# Patient Record
Sex: MALE | Race: WHITE | NOT HISPANIC OR LATINO | Employment: FULL TIME | URBAN - METROPOLITAN AREA
[De-identification: names, ages, dates, MRNs, and addresses within clinical notes are randomized per-mention and may not be internally consistent; named-entity substitution may affect disease eponyms.]

---

## 2021-06-25 ENCOUNTER — APPOINTMENT (EMERGENCY)
Dept: RADIOLOGY | Facility: HOSPITAL | Age: 61
End: 2021-06-25
Payer: OTHER GOVERNMENT

## 2021-06-25 ENCOUNTER — HOSPITAL ENCOUNTER (OUTPATIENT)
Facility: HOSPITAL | Age: 61
Setting detail: OBSERVATION
Discharge: HOME | End: 2021-06-27
Attending: EMERGENCY MEDICINE | Admitting: INTERNAL MEDICINE
Payer: OTHER GOVERNMENT

## 2021-06-25 DIAGNOSIS — I10 ESSENTIAL HYPERTENSION: ICD-10-CM

## 2021-06-25 DIAGNOSIS — M70.62 TROCHANTERIC BURSITIS OF LEFT HIP: ICD-10-CM

## 2021-06-25 DIAGNOSIS — R26.2 AMBULATORY DYSFUNCTION: ICD-10-CM

## 2021-06-25 DIAGNOSIS — I48.0 PAROXYSMAL ATRIAL FIBRILLATION (HCC): ICD-10-CM

## 2021-06-25 DIAGNOSIS — M70.62 GREATER TROCHANTERIC BURSITIS OF LEFT HIP: Primary | ICD-10-CM

## 2021-06-25 PROBLEM — I48.91 ATRIAL FIBRILLATION (HCC): Status: ACTIVE | Noted: 2021-06-25

## 2021-06-25 PROBLEM — M25.552 LEFT HIP PAIN: Status: ACTIVE | Noted: 2021-06-25

## 2021-06-25 PROBLEM — E11.9 TYPE 2 DIABETES MELLITUS, WITHOUT LONG-TERM CURRENT USE OF INSULIN (HCC): Status: ACTIVE | Noted: 2021-06-25

## 2021-06-25 PROBLEM — E66.01 MORBID OBESITY (HCC): Status: ACTIVE | Noted: 2021-06-25

## 2021-06-25 PROBLEM — J44.9 COPD (CHRONIC OBSTRUCTIVE PULMONARY DISEASE) (HCC): Status: ACTIVE | Noted: 2021-06-25

## 2021-06-25 LAB
ANION GAP SERPL CALCULATED.3IONS-SCNC: 6 MMOL/L (ref 4–13)
BUN SERPL-MCNC: 11 MG/DL (ref 5–25)
CALCIUM SERPL-MCNC: 9.1 MG/DL (ref 8.3–10.1)
CHLORIDE SERPL-SCNC: 106 MMOL/L (ref 100–108)
CO2 SERPL-SCNC: 24 MMOL/L (ref 21–32)
CREAT SERPL-MCNC: 0.68 MG/DL (ref 0.6–1.3)
ERYTHROCYTE [DISTWIDTH] IN BLOOD BY AUTOMATED COUNT: 12.2 % (ref 11.6–15.1)
GFR SERPL CREATININE-BSD FRML MDRD: 103 ML/MIN/1.73SQ M
GLUCOSE SERPL-MCNC: 109 MG/DL (ref 65–140)
GLUCOSE SERPL-MCNC: 112 MG/DL (ref 65–140)
GLUCOSE SERPL-MCNC: 183 MG/DL (ref 65–140)
HCT VFR BLD AUTO: 49.7 % (ref 36.5–49.3)
HGB BLD-MCNC: 16.7 G/DL (ref 12–17)
MCH RBC QN AUTO: 30.3 PG (ref 26.8–34.3)
MCHC RBC AUTO-ENTMCNC: 33.6 G/DL (ref 31.4–37.4)
MCV RBC AUTO: 90 FL (ref 82–98)
PLATELET # BLD AUTO: 222 THOUSANDS/UL (ref 149–390)
PMV BLD AUTO: 10.2 FL (ref 8.9–12.7)
POTASSIUM SERPL-SCNC: 4.2 MMOL/L (ref 3.5–5.3)
RBC # BLD AUTO: 5.51 MILLION/UL (ref 3.88–5.62)
SODIUM SERPL-SCNC: 136 MMOL/L (ref 136–145)
WBC # BLD AUTO: 8.08 THOUSAND/UL (ref 4.31–10.16)

## 2021-06-25 PROCEDURE — 96372 THER/PROPH/DIAG INJ SC/IM: CPT

## 2021-06-25 PROCEDURE — 99285 EMERGENCY DEPT VISIT HI MDM: CPT

## 2021-06-25 PROCEDURE — 73502 X-RAY EXAM HIP UNI 2-3 VIEWS: CPT

## 2021-06-25 PROCEDURE — 99285 EMERGENCY DEPT VISIT HI MDM: CPT | Performed by: EMERGENCY MEDICINE

## 2021-06-25 PROCEDURE — 99220 PR INITIAL OBSERVATION CARE/DAY 70 MINUTES: CPT | Performed by: INTERNAL MEDICINE

## 2021-06-25 PROCEDURE — 85027 COMPLETE CBC AUTOMATED: CPT | Performed by: INTERNAL MEDICINE

## 2021-06-25 PROCEDURE — 82948 REAGENT STRIP/BLOOD GLUCOSE: CPT

## 2021-06-25 PROCEDURE — 80048 BASIC METABOLIC PNL TOTAL CA: CPT | Performed by: INTERNAL MEDICINE

## 2021-06-25 RX ORDER — CELECOXIB 100 MG/1
100 CAPSULE ORAL 2 TIMES DAILY
Status: DISCONTINUED | OUTPATIENT
Start: 2021-06-25 | End: 2021-06-26

## 2021-06-25 RX ORDER — MAGNESIUM HYDROXIDE/ALUMINUM HYDROXICE/SIMETHICONE 120; 1200; 1200 MG/30ML; MG/30ML; MG/30ML
30 SUSPENSION ORAL EVERY 6 HOURS PRN
Status: DISCONTINUED | OUTPATIENT
Start: 2021-06-25 | End: 2021-06-27 | Stop reason: HOSPADM

## 2021-06-25 RX ORDER — LIDOCAINE 50 MG/G
1 PATCH TOPICAL ONCE
Status: COMPLETED | OUTPATIENT
Start: 2021-06-25 | End: 2021-06-25

## 2021-06-25 RX ORDER — FOLIC ACID 1 MG/1
3 TABLET ORAL DAILY
COMMUNITY

## 2021-06-25 RX ORDER — METHYLPREDNISOLONE 4 MG/1
TABLET ORAL
Qty: 21 TABLET | Refills: 0 | Status: SHIPPED | OUTPATIENT
Start: 2021-06-25 | End: 2021-06-27 | Stop reason: HOSPADM

## 2021-06-25 RX ORDER — METHYLPREDNISOLONE 4 MG/1
12 TABLET ORAL DAILY
Status: DISCONTINUED | OUTPATIENT
Start: 2021-06-28 | End: 2021-06-27 | Stop reason: HOSPADM

## 2021-06-25 RX ORDER — KETOROLAC TROMETHAMINE 30 MG/ML
30 INJECTION, SOLUTION INTRAMUSCULAR; INTRAVENOUS EVERY 6 HOURS PRN
Status: DISPENSED | OUTPATIENT
Start: 2021-06-25 | End: 2021-06-27

## 2021-06-25 RX ORDER — ATORVASTATIN CALCIUM 40 MG/1
40 TABLET, FILM COATED ORAL DAILY
COMMUNITY

## 2021-06-25 RX ORDER — FLUTICASONE PROPIONATE 50 MCG
1 SPRAY, SUSPENSION (ML) NASAL DAILY PRN
COMMUNITY

## 2021-06-25 RX ORDER — METHOCARBAMOL 500 MG/1
500 TABLET, FILM COATED ORAL 2 TIMES DAILY
Qty: 14 TABLET | Refills: 0 | Status: SHIPPED | OUTPATIENT
Start: 2021-06-25 | End: 2021-06-27 | Stop reason: HOSPADM

## 2021-06-25 RX ORDER — NAPROXEN 500 MG/1
500 TABLET ORAL 2 TIMES DAILY WITH MEALS
Qty: 14 TABLET | Refills: 0 | Status: SHIPPED | OUTPATIENT
Start: 2021-06-25 | End: 2021-06-25

## 2021-06-25 RX ORDER — ONDANSETRON 2 MG/ML
4 INJECTION INTRAMUSCULAR; INTRAVENOUS EVERY 6 HOURS PRN
Status: DISCONTINUED | OUTPATIENT
Start: 2021-06-25 | End: 2021-06-27 | Stop reason: HOSPADM

## 2021-06-25 RX ORDER — OXYCODONE HYDROCHLORIDE AND ACETAMINOPHEN 5; 325 MG/1; MG/1
1 TABLET ORAL EVERY 4 HOURS PRN
Qty: 5 TABLET | Refills: 0 | Status: SHIPPED | OUTPATIENT
Start: 2021-06-25 | End: 2021-06-25

## 2021-06-25 RX ORDER — KETOROLAC TROMETHAMINE 30 MG/ML
15 INJECTION, SOLUTION INTRAMUSCULAR; INTRAVENOUS EVERY 6 HOURS PRN
Status: DISCONTINUED | OUTPATIENT
Start: 2021-06-25 | End: 2021-06-25

## 2021-06-25 RX ORDER — ACETAMINOPHEN 325 MG/1
650 TABLET ORAL EVERY 6 HOURS PRN
Status: DISCONTINUED | OUTPATIENT
Start: 2021-06-25 | End: 2021-06-27 | Stop reason: HOSPADM

## 2021-06-25 RX ORDER — ATORVASTATIN CALCIUM 40 MG/1
40 TABLET, FILM COATED ORAL
Status: DISCONTINUED | OUTPATIENT
Start: 2021-06-25 | End: 2021-06-27 | Stop reason: HOSPADM

## 2021-06-25 RX ORDER — OXYCODONE HYDROCHLORIDE AND ACETAMINOPHEN 5; 325 MG/1; MG/1
1 TABLET ORAL ONCE
Status: COMPLETED | OUTPATIENT
Start: 2021-06-25 | End: 2021-06-25

## 2021-06-25 RX ORDER — KETOROLAC TROMETHAMINE 30 MG/ML
15 INJECTION, SOLUTION INTRAMUSCULAR; INTRAVENOUS EVERY 6 HOURS PRN
Status: DISPENSED | OUTPATIENT
Start: 2021-06-25 | End: 2021-06-27

## 2021-06-25 RX ORDER — CELECOXIB 100 MG/1
100 CAPSULE ORAL 2 TIMES DAILY
COMMUNITY

## 2021-06-25 RX ORDER — FOLIC ACID 1 MG/1
3 TABLET ORAL DAILY
Status: DISCONTINUED | OUTPATIENT
Start: 2021-06-26 | End: 2021-06-27 | Stop reason: HOSPADM

## 2021-06-25 RX ORDER — METHYLPREDNISOLONE 4 MG/1
8 TABLET ORAL DAILY
Status: DISCONTINUED | OUTPATIENT
Start: 2021-06-29 | End: 2021-06-27 | Stop reason: HOSPADM

## 2021-06-25 RX ORDER — FLUTICASONE PROPIONATE 50 MCG
1 SPRAY, SUSPENSION (ML) NASAL DAILY PRN
Status: DISCONTINUED | OUTPATIENT
Start: 2021-06-25 | End: 2021-06-27 | Stop reason: HOSPADM

## 2021-06-25 RX ORDER — METHOCARBAMOL 500 MG/1
500 TABLET, FILM COATED ORAL 2 TIMES DAILY
Qty: 10 TABLET | Refills: 0 | Status: SHIPPED | OUTPATIENT
Start: 2021-06-25 | End: 2021-06-25

## 2021-06-25 RX ORDER — METHYLPREDNISOLONE 4 MG/1
4 TABLET ORAL DAILY
Status: DISCONTINUED | OUTPATIENT
Start: 2021-06-30 | End: 2021-06-27 | Stop reason: HOSPADM

## 2021-06-25 RX ORDER — KETOROLAC TROMETHAMINE 30 MG/ML
15 INJECTION, SOLUTION INTRAMUSCULAR; INTRAVENOUS ONCE
Status: COMPLETED | OUTPATIENT
Start: 2021-06-25 | End: 2021-06-25

## 2021-06-25 RX ORDER — METHYLPREDNISOLONE 16 MG/1
16 TABLET ORAL DAILY
Status: COMPLETED | OUTPATIENT
Start: 2021-06-27 | End: 2021-06-27

## 2021-06-25 RX ADMIN — ATORVASTATIN CALCIUM 40 MG: 40 TABLET, FILM COATED ORAL at 19:23

## 2021-06-25 RX ADMIN — LIDOCAINE 1 PATCH: 50 PATCH TOPICAL at 11:10

## 2021-06-25 RX ADMIN — METHYLPREDNISOLONE 24 MG: 16 TABLET ORAL at 20:00

## 2021-06-25 RX ADMIN — OXYCODONE HYDROCHLORIDE AND ACETAMINOPHEN 1 TABLET: 5; 325 TABLET ORAL at 12:17

## 2021-06-25 RX ADMIN — KETOROLAC TROMETHAMINE 15 MG: 30 INJECTION, SOLUTION INTRAMUSCULAR; INTRAVENOUS at 10:38

## 2021-06-25 RX ADMIN — KETOROLAC TROMETHAMINE 15 MG: 30 INJECTION, SOLUTION INTRAMUSCULAR; INTRAVENOUS at 19:57

## 2021-06-25 RX ADMIN — CELECOXIB 100 MG: 100 CAPSULE ORAL at 19:59

## 2021-06-25 RX ADMIN — KETOROLAC TROMETHAMINE 30 MG: 30 INJECTION, SOLUTION INTRAMUSCULAR; INTRAVENOUS at 18:06

## 2021-06-25 NOTE — H&P
[address] [facility] 1960, 63 y o  male MRN: [id number]  Unit/Bed#: -01 Encounter: [id number]  Primary Care Provider: No primary care provider on file  Date and time admitted to hospital: 6/25/2021 10:09 AM    * Left hip pain  Assessment & Plan  Denies any trauma  Status post x-ray of hip with degenerative changes  Underlying history of psoriatic arthritis  Placed on p r n  Toradol, patient wishes to avoid narcotics given occupation  Trial of steroids, thus started on Medrol Dosepak  PT/OT evaluation  If no improvement consider orthopedic consult, questionable if patient may have trochanter bursitis      COPD (chronic obstructive pulmonary disease) ([place] [address] )  Assessment & Plan  No sign of exacerbation    Atrial fibrillation ([place] [address] )  Assessment & Plan  Paroxymal  S/p CV  Not chronically on anticoagulation or HR controlling meds    Morbid obesity ([place] [address] )  Assessment & Plan  Educated on lifestyle/dietary modification    Type 2 diabetes mellitus, without long-term current use of insulin ([place] [address] )  Assessment & Plan  No results found for: HGBA1C    No results for input(s): POCGLU in the last 72 hours  Blood Sugar Average: Last 72 hrs:  chronically on metformin  Placed on insulin sliding scale while inpatient      VTE Prophylaxis: Enoxaparin (Lovenox)  / sequential compression device   Code Status:  Full code  POLST: There is no POLST form on file for this patient (pre-hospital)  Discussion with family:  Plan of care discussed with patient at length    Anticipated Length of Stay:  Patient will be admitted on an Observation basis with an anticipated length of stay of  less than 2 midnights  Justification for Hospital Stay:  Ambulatory dysfunction    Total Time for Visit, including Counseling / Coordination of Care: 45 minutes  Greater than 50% of this total time spent on direct patient counseling and coordination of care      Chief Complaint:   Left hip pain    History of Present Illness:    Dustin Sandoval is a 63 y o  male medical history significant for testicular cancer and lung cancer status post right lobectomy both initially dx in 1982 has been in remission since then, non-insulin-dependent diabetes, paroxysmal atrial fibrillation he presents to the ER with complaint of left hip pain  Patient actually is a  who resides in [place], he follows up with Dr Tana Schultz  He reports that after his flight yesterday while at the hotel had left hip pain  He denies trauma or fall  He denies any radiculopathy  He does have a underlying history of psoriatric arthritis  He denies any fever, chills  He denies any calf edema or tenderness  He denies shortness of breath  Upon presentation to the ER, x-ray was done revealing degenerative disease  Thought was possibility of trochanteric bursitis and plan was to discharge patient home from the ER  However upon trial of ambulation he was unable to bear much weight thus recommended to monitor overnight for pain control and PT/OT evaluation  Review of Systems   Constitutional: Negative  Eyes: Negative  Respiratory: Negative  Cardiovascular: Negative  Gastrointestinal: Negative  Endocrine: Negative  Genitourinary: Negative  Musculoskeletal: Positive for arthralgias and gait problem  Skin: Negative  Allergic/Immunologic: Negative  Hematological: Negative  Psychiatric/Behavioral: Negative  Past Medical and Surgical History:     Past Medical History:   Diagnosis Date    Atrial fibrillation ([facility] )     COPD (chronic obstructive pulmonary disease) ([facility] )     Diabetes mellitus ([facility] )     Lung cancer ([facility] )     Psoriasis     Psoriatic arthritis ([facility] )     Testicular cancer ([facility] )        Past Surgical History:   Procedure Laterality Date    BACK SURGERY      LUNG LOBECTOMY         Meds/Allergies:    Prior to Admission medications    Medication Sig Start Date End Date Taking?  Authorizing Provider   atorvastatin (LIPITOR) 40 mg tablet Take 40 mg by mouth daily   Yes Historical Provider, MD   celecoxib (CeleBREX) 100 mg capsule Take 100 mg by mouth 2 (two) times a day   Yes Historical Provider, MD   fluticasone (FLONASE) 50 mcg/act nasal spray 1 spray into each nostril daily as needed for rhinitis   Yes Historical Provider, MD   folic acid (FOLVITE) 1 mg tablet Take 3 mg by mouth daily   Yes Historical Provider, MD   metFORMIN (GLUCOPHAGE) 500 mg tablet Take 500 mg by mouth 2 (two) times a day with meals   Yes Historical Provider, MD   methocarbamol (ROBAXIN) 500 mg tablet Take 1 tablet (500 mg total) by mouth 2 (two) times a day for 7 days 6/25/21 7/2/21  Fátima Bunn DO   methylPREDNISolone 4 MG tablet therapy pack Use as directed on package 6/25/21   Fátima Bunn DO   methocarbamol (ROBAXIN) 500 mg tablet Take 1 tablet (500 mg total) by mouth 2 (two) times a day for 5 days 6/25/21 6/25/21  Fátima Bunn DO   naproxen (NAPROSYN) 500 mg tablet Take 1 tablet (500 mg total) by mouth 2 (two) times a day with meals for 7 days 6/25/21 6/25/21  Fátima Bunn DO   oxyCODONE-acetaminophen (PERCOCET) 5-325 mg per tablet Take 1 tablet by mouth every 4 (four) hours as needed for moderate pain for up to 5 dosesMax Daily Amount: 6 tablets 6/25/21 6/25/21  Gurwinder Melton MD     I have reviewed home medications with patient personally  Allergies:    Allergies   Allergen Reactions    Morphine Vomiting       Social History:     Marital Status: /Civil Union   Occupation:  Works as a   Patient Pre-hospital Living Situation:  Resides in [address] [place] [address]  Patient Pre-hospital Level of Mobility:  Independent  Patient Pre-hospital Diet Restrictions:  None  Substance Use History:   Social History     Substance and Sexual Activity   Alcohol Use Never     Social History     Tobacco Use   Smoking Status Never Smoker   Smokeless Tobacco Never Used     Social History     Substance and Sexual Activity   Drug Use Not on file       Family History:    History reviewed  No pertinent family history  Physical Exam:     Vitals:   Blood Pressure: 141/76 (06/25/21 1029)  Pulse: 59 (06/25/21 1029)  Temperature: 98 °F (36 7 °C) (06/25/21 1028)  Respirations: 16 (06/25/21 1028)  Height: 6' (182 9 cm) (06/25/21 1029)  Weight - Scale: 109 kg (240 lb) (06/25/21 1029)  SpO2: 97 % (06/25/21 1029)    Physical Exam  Constitutional:       Appearance: He is obese  Cardiovascular:      Rate and Rhythm: Normal rate and regular rhythm  Pulses: Normal pulses  Heart sounds: Normal heart sounds  No murmur heard  No gallop  Pulmonary:      Effort: Pulmonary effort is normal  No respiratory distress  Breath sounds: Normal breath sounds  No wheezing or rales  Abdominal:      General: Abdomen is flat  Bowel sounds are normal  There is no distension  Palpations: Abdomen is soft  Tenderness: There is no abdominal tenderness  There is no guarding  Musculoskeletal:      Cervical back: Normal range of motion and neck supple  Right lower leg: No edema  Left lower leg: No edema  Comments: Limited range of motion of left hip  Negative Homans sign   Skin:     General: Skin is warm and dry  Neurological:      General: No focal deficit present  Mental Status: He is alert and oriented to person, place, and time  Mental status is at baseline  Cranial Nerves: No cranial nerve deficit  Motor: No weakness  Additional Data:     Lab Results: Awaiting routine labs                              Imaging: I have personally reviewed pertinent reports  XR hip/pelv 2-3 vws left if performed   Final Result by Shreyas Palencia MD (06/25 1243)   Degenerative changes left hip      No acute osseous abnormality  Workstation performed: [id number]             EKG, Pathology, and Other Studies Reviewed on Admission:   · EKG:     Allscripts / Epic Records Reviewed:  Yes ** Please Note: This note has been constructed using a voice recognition system   **

## 2021-06-25 NOTE — ED PROVIDER NOTES
History  Chief Complaint   Patient presents with    Hip Pain     left non traumatic hip pain     Patient is a 63year old male with a past medical history of diabetes, lung cancer, and atrial fibrillation who presents for evaluation of left hip pain  Patient states that he was on a plane ride yesterday from [facility]  Patient noticed when he stood up to get off the plane that his left hip was mildly uncomfortable  Patient states that when he woke up this morning, the pain was much worse  Pain is located over the greater trochanter of the left femur  Pain does not radiate  Pain is worse when patient tries to stand or walk, but is also present at rest  Pain is a 10/10 currently  Patient denies any injury to the area  Denies any lower extremity swelling, numbness, or tingling  Denies any other symptoms including fevers, chills, chest pain, SOB, abdominal pain, nausea, or vomiting  History provided by:  Patient   used: No    Hip Pain  Location:  Left hip  Severity:  Severe  Onset quality:  Gradual  Duration:  1 day  Timing:  Constant  Progression:  Worsening  Chronicity:  New  Context:  After plane ride yesterday  Worsened by:  Standing, walking  Associated symptoms: no abdominal pain, no chest pain, no cough, no fever, no nausea, no rash, no shortness of breath and no vomiting        Prior to Admission Medications   Prescriptions Last Dose Informant Patient Reported?  Taking?   atorvastatin (LIPITOR) 40 mg tablet   Yes Yes   Sig: Take 40 mg by mouth daily   celecoxib (CeleBREX) 100 mg capsule   Yes Yes   Sig: Take 100 mg by mouth 2 (two) times a day   fluticasone (FLONASE) 50 mcg/act nasal spray   Yes Yes   Si spray into each nostril daily as needed for rhinitis   folic acid (FOLVITE) 1 mg tablet   Yes Yes   Sig: Take 3 mg by mouth daily   metFORMIN (GLUCOPHAGE) 500 mg tablet   Yes Yes   Sig: Take 500 mg by mouth 2 (two) times a day with meals      Facility-Administered Medications: None Past Medical History:   Diagnosis Date    Atrial fibrillation ([facility] )     Diabetes mellitus ([facility] )     Lung cancer ([facility] )        History reviewed  No pertinent surgical history  History reviewed  No pertinent family history  I have reviewed and agree with the history as documented  E-Cigarette/Vaping     E-Cigarette/Vaping Substances     Social History     Tobacco Use    Smoking status: Never Smoker    Smokeless tobacco: Never Used   Substance Use Topics    Alcohol use: Never    Drug use: Not on file        Review of Systems   Constitutional: Negative for chills and fever  HENT: Negative  Respiratory: Negative for cough and shortness of breath  Cardiovascular: Negative for chest pain and leg swelling  Gastrointestinal: Negative for abdominal pain, nausea and vomiting  Genitourinary: Negative  Musculoskeletal: Positive for arthralgias  Negative for back pain  Skin: Negative for rash and wound  Neurological: Negative  Negative for weakness and numbness  All other systems reviewed and are negative  Physical Exam  ED Triage Vitals   Temperature Pulse Respirations Blood Pressure SpO2   06/25/21 1028 06/25/21 1029 06/25/21 1028 06/25/21 1029 06/25/21 1029   98 °F (36 7 °C) 59 16 141/76 97 %      Temp src Heart Rate Source Patient Position - Orthostatic VS BP Location FiO2 (%)   -- -- -- -- --             Pain Score       06/25/21 1029       Worst Possible Pain             Orthostatic Vital Signs  Vitals:    06/25/21 1029   BP: 141/76   Pulse: 59       Physical Exam  Vitals and nursing note reviewed  Constitutional:       General: He is awake  He is not in acute distress  Appearance: He is not ill-appearing  HENT:      Head: Normocephalic and atraumatic  Eyes:      General: Vision grossly intact  Gaze aligned appropriately  Cardiovascular:      Rate and Rhythm: Normal rate and regular rhythm        Heart sounds: S1 normal and S2 normal    Pulmonary:      Effort: Pulmonary effort is normal  No respiratory distress  Breath sounds: Normal breath sounds  No wheezing, rhonchi or rales  Abdominal:      Palpations: Abdomen is soft  Tenderness: There is no abdominal tenderness  Musculoskeletal:      Left hip: Tenderness (over greater trochanter of femur) present  Normal range of motion  Comments: Full passive and active ROM of left hip   Skin:     General: Skin is warm and dry  Findings: No bruising, ecchymosis or rash  Neurological:      General: No focal deficit present  Mental Status: He is alert and oriented to person, place, and time  ED Medications  Medications   lidocaine (LIDODERM) 5 % patch 1 patch (1 patch Topical Medication Applied 6/25/21 1110)   ketorolac (TORADOL) injection 15 mg (15 mg Intramuscular Given 6/25/21 1038)   oxyCODONE-acetaminophen (PERCOCET) 5-325 mg per tablet 1 tablet (1 tablet Oral Given 6/25/21 1217)       Diagnostic Studies  Results Reviewed     Procedure Component Value Units Date/Time    CBC [[id number]]     Lab Status: No result Specimen: Blood     Basic metabolic panel [[id number]]     Lab Status: No result Specimen: Blood                  XR hip/pelv 2-3 vws left if performed   Final Result by Chencho Barclay MD (06/25 1243)   Degenerative changes left hip      No acute osseous abnormality  Workstation performed: [id number]               Procedures  Procedures      ED Course  ED Course as of Jun 25 1353   Fri Jun 25, 2021   1118 Patient still having pain  Will give a few more minutes and re-evaluate      1352 Patient fell twice while getting dressed, was stuck on floor after second fall  No headstrike  No pain elsewhere  Patient admitted to [facility] for ambulatory dysfunction                                             MDM  Number of Diagnoses or Management Options  Ambulatory dysfunction: new and requires workup  Greater trochanteric bursitis of left hip: new and requires workup  Diagnosis management comments: 63year old male presents with severe left hip pain after a plane ride yesterday  Patient extremely tender over greater trochanter of left hip  ROM of left hip normal  No traumatic injuries  No overlying erythema or warmth  X-rays obtained showed degenerative changes, but no acute fractures or dislocations  Given the location of the pain, suspect a likely bursitis  Patient was given multiple pain medications including lidoderm, toradol, and oxycodone without relief of symptoms  Patient was willing to go home with prescriptions for pain relievers and outpatient follow up  Patient stood to get dressed to depart from the emergency room when he states that his left leg gave out on him  Patient fell twice, and was stuck on the ground after the second fall  Denies headstrike or other injuries  Patient was assisted back into bed  Given patient's inability to walk secondary to pain, patient was admitted to medicine for further symptom control  Patient admitted in stable condition  Amount and/or Complexity of Data Reviewed  Tests in the radiology section of CPT®: ordered and reviewed  Discuss the patient with other providers: yes    Patient Progress  Patient progress: stable      Disposition  Final diagnoses:   Greater trochanteric bursitis of left hip   Ambulatory dysfunction     Time reflects when diagnosis was documented in both MDM as applicable and the Disposition within this note     Time User Action Codes Description Comment    6/25/2021 11:03 AM Gail Emanuel [M70 62] Greater trochanteric bursitis of left hip     6/25/2021  1:43 PM Gail Emanuel [R26 2] Ambulatory dysfunction       ED Disposition     ED Disposition Condition Date/Time Comment    Admit Stable Fri Jun 25, 2021  1:43 PM Case was discussed with RAJWINDER and the patient's admission status was agreed to be Admission Status: observation status to the service of Dr Zayra Pabon           Follow-up Information     Follow up With Specialties Details Why Contact Info Additional [address] Emergency Department Emergency Medicine Go to  If symptoms worsen [address]  [address] Emergency Department, [address], [address]   [phone]          Patient's Medications   Discharge Prescriptions    METHOCARBAMOL (ROBAXIN) 500 MG TABLET    Take 1 tablet (500 mg total) by mouth 2 (two) times a day for 7 days       Start Date: 6/25/2021 End Date: 7/2/2021       Order Dose: 500 mg       Quantity: 14 tablet    Refills: 0    METHYLPREDNISOLONE 4 MG TABLET THERAPY PACK    Use as directed on package       Start Date: 6/25/2021 End Date: --       Order Dose: --       Quantity: 21 tablet    Refills: 0     No discharge procedures on file  PDMP Review     None           ED Provider  Attending physically available and evaluated Ana Kapoor I managed the patient along with the ED Attending      Electronically Signed by         Michael Goodson DO  06/29/21 5078

## 2021-06-25 NOTE — ASSESSMENT & PLAN NOTE
No results found for: HGBA1C    No results for input(s): POCGLU in the last 72 hours      Blood Sugar Average: Last 72 hrs:  chronically on metformin  Placed on insulin sliding scale while inpatient

## 2021-06-25 NOTE — DISCHARGE INSTRUCTIONS
Discharge Instructions - Daisy SAMUEL Shelly 63 y o  male MRN: [id number]  Unit/Bed#:  -01    Weight Bearing Status:                                           Weight Bearing as tolerated to the left lower extremity  Pain:  Continue analgesics as directed    PT/OT:  Continue PT/OT on outpatient basis as directed    Appt Instructions: If you do not have your appointment, please call the clinic at [phone] if desired    Contact the office sooner if you experience any increased numbness/tingling in the extremities  Miscellaneous:  Please take prescribed medications as directed  Please follow up with your PCP if symptoms do not improve  Return to the emergency department for any new or worsening symptoms

## 2021-06-25 NOTE — ASSESSMENT & PLAN NOTE
Denies any trauma  Status post x-ray of hip with degenerative changes  Underlying history of psoriatic arthritis  Placed on p r n   Toradol, patient wishes to avoid narcotics given occupation  Trial of steroids, thus started on Medrol Dosepak  PT/OT evaluation  If no improvement consider orthopedic consult, questionable if patient may have trochanter bursitis

## 2021-06-25 NOTE — ED NOTES
Staff reports patient fell when he tried to get from the stretcher to a wheelchair  Denies head strike  Will be admitted for ambulatory dysfunction due to left hip pain       Mindy Jimenez RN  06/25/21 2610

## 2021-06-25 NOTE — ED ATTENDING ATTESTATION
6/25/2021  I, Loren Nolbe MD, saw and evaluated the patient  I have discussed the patient with the resident/non-physician practitioner and agree with the resident's/non-physician practitioner's findings, Plan of Care, and MDM as documented in the resident's/non-physician practitioner's note, except where noted  All available labs and Radiology studies were reviewed  I was present for key portions of any procedure(s) performed by the resident/non-physician practitioner and I was immediately available to provide assistance  At this point I agree with the current assessment done in the Emergency Department  I have conducted an independent evaluation of this patient a history and physical is as follows:  Pt was on plane ride yesterday approx 2 hour When he got off hip was painful this am when walking to [place] the pain is more severe  Pain is constant No trauma no fevers no chills no abd pain no b and b or back pain  PE: alert tender lateral aspect of L hip from [facility] lungs clear abd soft nontender MDM will do xray treat pain   ED Course         Critical Care Time  Procedures

## 2021-06-26 PROBLEM — L40.50 PSORIATIC ARTHRITIS (HCC): Chronic | Status: ACTIVE | Noted: 2021-06-26

## 2021-06-26 PROBLEM — M70.62 TROCHANTERIC BURSITIS OF LEFT HIP: Status: ACTIVE | Noted: 2021-06-25

## 2021-06-26 PROBLEM — E78.5 HLD (HYPERLIPIDEMIA): Chronic | Status: ACTIVE | Noted: 2021-06-26

## 2021-06-26 LAB
GLUCOSE SERPL-MCNC: 128 MG/DL (ref 65–140)
GLUCOSE SERPL-MCNC: 146 MG/DL (ref 65–140)
GLUCOSE SERPL-MCNC: 147 MG/DL (ref 65–140)
GLUCOSE SERPL-MCNC: 168 MG/DL (ref 65–140)

## 2021-06-26 PROCEDURE — 82948 REAGENT STRIP/BLOOD GLUCOSE: CPT

## 2021-06-26 PROCEDURE — 99225 PR SBSQ OBSERVATION CARE/DAY 25 MINUTES: CPT | Performed by: INTERNAL MEDICINE

## 2021-06-26 RX ORDER — AMIODARONE HYDROCHLORIDE 200 MG/1
100 TABLET ORAL
Status: DISCONTINUED | OUTPATIENT
Start: 2021-06-26 | End: 2021-06-27 | Stop reason: HOSPADM

## 2021-06-26 RX ORDER — METOPROLOL SUCCINATE 100 MG/1
200 TABLET, EXTENDED RELEASE ORAL DAILY
Status: DISCONTINUED | OUTPATIENT
Start: 2021-06-26 | End: 2021-06-27 | Stop reason: HOSPADM

## 2021-06-26 RX ORDER — AMLODIPINE BESYLATE 5 MG/1
5 TABLET ORAL DAILY
Status: ON HOLD | COMMUNITY
End: 2021-06-27 | Stop reason: SDUPTHER

## 2021-06-26 RX ORDER — METOPROLOL SUCCINATE 200 MG/1
200 TABLET, EXTENDED RELEASE ORAL DAILY
Status: ON HOLD | COMMUNITY
End: 2021-06-27 | Stop reason: SDUPTHER

## 2021-06-26 RX ORDER — TELMISARTAN 40 MG/1
40 TABLET ORAL DAILY
Status: ON HOLD | COMMUNITY
End: 2021-06-27 | Stop reason: SDUPTHER

## 2021-06-26 RX ADMIN — CELECOXIB 100 MG: 100 CAPSULE ORAL at 08:31

## 2021-06-26 RX ADMIN — KETOROLAC TROMETHAMINE 30 MG: 30 INJECTION, SOLUTION INTRAMUSCULAR; INTRAVENOUS at 22:35

## 2021-06-26 RX ADMIN — FOLIC ACID 3 MG: 1 TABLET ORAL at 08:31

## 2021-06-26 RX ADMIN — KETOROLAC TROMETHAMINE 30 MG: 30 INJECTION, SOLUTION INTRAMUSCULAR; INTRAVENOUS at 00:06

## 2021-06-26 RX ADMIN — KETOROLAC TROMETHAMINE 30 MG: 30 INJECTION, SOLUTION INTRAMUSCULAR; INTRAVENOUS at 14:41

## 2021-06-26 RX ADMIN — KETOROLAC TROMETHAMINE 30 MG: 30 INJECTION, SOLUTION INTRAMUSCULAR; INTRAVENOUS at 09:35

## 2021-06-26 RX ADMIN — ACETAMINOPHEN 650 MG: 325 TABLET, FILM COATED ORAL at 17:36

## 2021-06-26 RX ADMIN — INSULIN LISPRO 2 UNITS: 100 INJECTION, SOLUTION INTRAVENOUS; SUBCUTANEOUS at 12:11

## 2021-06-26 RX ADMIN — METHYLPREDNISOLONE 20 MG: 16 TABLET ORAL at 08:30

## 2021-06-26 RX ADMIN — ENOXAPARIN SODIUM 40 MG: 40 INJECTION SUBCUTANEOUS at 08:31

## 2021-06-26 RX ADMIN — AMIODARONE HYDROCHLORIDE 100 MG: 200 TABLET ORAL at 14:41

## 2021-06-26 RX ADMIN — KETOROLAC TROMETHAMINE 15 MG: 30 INJECTION, SOLUTION INTRAMUSCULAR; INTRAVENOUS at 03:27

## 2021-06-26 RX ADMIN — ATORVASTATIN CALCIUM 40 MG: 40 TABLET, FILM COATED ORAL at 16:51

## 2021-06-26 RX ADMIN — METOPROLOL SUCCINATE 200 MG: 100 TABLET, EXTENDED RELEASE ORAL at 16:51

## 2021-06-26 NOTE — ASSESSMENT & PLAN NOTE
· Home medications include : Toprol  mg daily, Amlodipine 5 mg daily, Telmisartan 40 mg daily  · Based on current BP reading off meds begin Toprol XL for now and add ARB and Amlodipine if needed

## 2021-06-26 NOTE — ASSESSMENT & PLAN NOTE
No results found for: HGBA1C    Recent Labs     06/25/21  2219 06/26/21  0618 06/26/21  1052 06/26/21  1558   POCGLU 109 128 168* 147*       Blood Sugar Average: Last 72 hrs:  (P) 132 8   Metformin held  Ct SSI  Monitor for hyperglycemia as on steroids  Placed on insulin sliding scale while inpatient

## 2021-06-26 NOTE — ASSESSMENT & PLAN NOTE
Paroxymal  S/p CV  Ct home medications Toprol  mg daily and Amiodarone 100 mg daily  Not on anticoagulation at home

## 2021-06-26 NOTE — PLAN OF CARE
Problem: PAIN - ADULT  Goal: Verbalizes/displays adequate comfort level or baseline comfort level  Description: Interventions:  - Encourage patient to monitor pain and request assistance  - Assess pain using appropriate pain scale  - Administer analgesics based on type and severity of pain and evaluate response  - Implement non-pharmacological measures as appropriate and evaluate response  - Consider cultural and social influences on pain and pain management  - Notify physician/advanced practitioner if interventions unsuccessful or patient reports new pain  Outcome: Progressing     Problem: INFECTION - ADULT  Goal: Absence or prevention of progression during hospitalization  Description: INTERVENTIONS:  - Assess and monitor for signs and symptoms of infection  - Monitor lab/diagnostic results  - Monitor all insertion sites, i e  indwelling lines, tubes, and drains  - Monitor endotracheal if appropriate and nasal secretions for changes in amount and color  - [place] appropriate cooling/warming therapies per order  - Administer medications as ordered  - Instruct and encourage patient and family to use good hand hygiene technique  - Identify and instruct in appropriate isolation precautions for identified infection/condition  Outcome: Progressing     Problem: SAFETY ADULT  Goal: Patient will remain free of falls  Description: INTERVENTIONS:  - Educate patient/family on patient safety including physical limitations  - Instruct patient to call for assistance with activity   - Consult OT/PT to assist with strengthening/mobility   - Keep Call bell within reach  - Keep bed low and locked with side rails adjusted as appropriate  - Keep care items and personal belongings within reach  - Initiate and maintain comfort rounds  - Make Fall Risk Sign visible to staff  - Initiate/Maintain alarm  - Apply yellow socks and bracelet for high fall risk patients  - Consider moving patient to room near nurses station  Outcome: Progressing  Goal: Maintain or return to baseline ADL function  Description: INTERVENTIONS:  -  Assess patient's ability to carry out ADLs; assess patient's baseline for ADL function and identify physical deficits which impact ability to perform ADLs (bathing, care of mouth/teeth, toileting, grooming, dressing, etc )  - Assess/evaluate cause of self-care deficits   - Assess range of motion  - Assess patient's mobility; develop plan if impaired  - Assess patient's need for assistive devices and provide as appropriate  - Encourage maximum independence but intervene and supervise when necessary  - Involve family in performance of ADLs  - Assess for home care needs following discharge   - Consider OT consult to assist with ADL evaluation and planning for discharge  - Provide patient education as appropriate  Outcome: Progressing     Problem: DISCHARGE PLANNING  Goal: Discharge to home or other facility with appropriate resources  Description: INTERVENTIONS:  - Identify barriers to discharge w/patient and caregiver  - Arrange for needed discharge resources and transportation as appropriate  - Identify discharge learning needs (meds, wound care, etc )  - Arrange for interpretive services to assist at discharge as needed  - Refer to Case Management Department for coordinating discharge planning if the patient needs post-hospital services based on physician/advanced practitioner order or complex needs related to functional status, cognitive ability, or social support system  Outcome: Progressing     Problem: Knowledge Deficit  Goal: Patient/family/caregiver demonstrates understanding of disease process, treatment plan, medications, and discharge instructions  Description: Complete learning assessment and assess knowledge base    Interventions:  - Provide teaching at level of understanding  - Provide teaching via preferred learning methods  Outcome: Progressing     Problem: Potential for Falls  Goal: Patient will remain free of falls  Description: INTERVENTIONS:  - Educate patient/family on patient safety including physical limitations  - Instruct patient to call for assistance with activity   - Consult OT/PT to assist with strengthening/mobility   - Keep Call bell within reach  - Keep bed low and locked with side rails adjusted as appropriate  - Keep care items and personal belongings within reach  - Initiate and maintain comfort rounds  - Make Fall Risk Sign visible to staff  - Initiate/Maintain alarm  - Apply yellow socks and bracelet for high fall risk patients  - Consider moving patient to room near nurses station  Outcome: Progressing

## 2021-06-26 NOTE — PROGRESS NOTES
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  Pain worse with ambulation  · Presented to the ED on 6/25/21 - admitted as fell when he tried to bear weight on his left lower extremity  · Xray - "Degenerative changes left hip   No acute osseous abnormality "  · Pain improved with Medrol dose pack and Toradol prn  · Seen by ortho - steroid injection may be considered, WBAT  · PT eval pending  · Check venous dopplers      Type 2 diabetes mellitus, without long-term current use of insulin [facility])  Assessment & Plan  No results found for: HGBA1C    Recent Labs     06/25/21  2219 06/26/21  0618 06/26/21  1052 06/26/21  1558   POCGLU 109 128 168* 147*       Blood Sugar Average: Last 72 hrs:  (P) 132 8   Metformin held  Ct SSI  Monitor for hyperglycemia as on steroids  Placed on insulin sliding scale while inpatient      Atrial fibrillation (HCC)  Assessment & Plan  Paroxymal  S/p CV  Ct home medications Toprol  mg daily and Amiodarone 100 mg daily  Not on anticoagulation at home    Essential hypertension  Assessment & Plan  · Home medications include : Toprol  mg daily, Amlodipine 5 mg daily, Telmisartan 40 mg daily  · Based on current BP reading off meds begin Toprol XL for now and add ARB and Amlodipine if needed    COPD (chronic obstructive pulmonary disease) ([facility] [address] )  Assessment & Plan  No exacerbation  Not on inhalers at home    Psoriatic arthritis [facility])  Assessment & Plan  · Home meds: Enbrel weekly (Sun), Methotrexate 8 tabs of 2 5 mg weekly (Sun)    HLD (hyperlipidemia)  Assessment & Plan  · Ct statin        VTE Pharmacologic Prophylaxis: VTE Score: 4 Moderate Risk (Score 3-4) - Pharmacological DVT Prophylaxis Ordered: enoxaparin (Lovenox)  Patient Centered Rounds: Discussed with RN    Education and Discussions with Family / Patient: Patient declined call to   Time Spent for Care: 20 minutes  More than 50% of total time spent on counseling and coordination of care as described above  Current Length of Stay: 0 day(s)  Current Patient Status: Observation   Certification Statement: Needs continued stay for pain control requiring intravenous medications, severe ambulatory dysfunction awaiting PT evaluation    Code Status: Level 1 - Full Code    Subjective:   Some improvement in left hip pain better but not able to ambulate  No fever  Objective:     Vitals:   Temp (24hrs), Av 9 °F (36 6 °C), Min:97 8 °F (36 6 °C), Max:97 9 °F (36 6 °C)    Temp:  [97 8 °F (36 6 °C)-97 9 °F (36 6 °C)] 97 8 °F (36 6 °C)  BP: (121-132)/(69-75) 132/75  Body mass index is 32 55 kg/m²  Physical Exam:   Physical Exam  Vitals reviewed  HENT:      Head: Normocephalic  Nose: Nose normal       Mouth/Throat:      Mouth: Mucous membranes are moist    Eyes:      Extraocular Movements: Extraocular movements intact  Cardiovascular:      Rate and Rhythm: Normal rate and regular rhythm  Pulmonary:      Effort: Pulmonary effort is normal  No respiratory distress  Breath sounds: Normal breath sounds  No wheezing  Abdominal:      General: Bowel sounds are normal  There is no distension  Palpations: Abdomen is soft  Tenderness: There is no abdominal tenderness  Musculoskeletal:      Cervical back: Neck supple  Comments: Tenderness over left greater trochanter   Skin:     General: Skin is warm and dry  Neurological:      General: No focal deficit present  Mental Status: He is alert and oriented to person, place, and time     Psychiatric:         Mood and Affect: Mood normal          Behavior: Behavior normal           Additional Data:     Labs:  Results from last 7 days   Lab Units 06/25/21  1805   WBC Thousand/uL 8 08   HEMOGLOBIN g/dL 16 7   HEMATOCRIT % 49 7*   PLATELETS Thousands/uL 222     Results from last 7 days   Lab Units 06/25/21  1805   SODIUM mmol/L 136   POTASSIUM mmol/L 4 2   CHLORIDE mmol/L 106   CO2 mmol/L 24   BUN mg/dL 11   CREATININE mg/dL 0 68   ANION GAP mmol/L 6   CALCIUM mg/dL 9 1   GLUCOSE RANDOM mg/dL 183*         Results from last 7 days   Lab Units 06/26/21  1558 06/26/21  1052 06/26/21  0618 06/25/21  2219 06/25/21  1726   POC GLUCOSE mg/dl 147* 168* 128 109 112               Lines/Drains:  Invasive Devices     Peripheral Intravenous Line            Peripheral IV 06/25/21 Distal;Right;Upper;Ventral (anterior) Arm <1 day              Last 24 Hours Medication List:   Current Facility-Administered Medications   Medication Dose Route Frequency Provider Last Rate    acetaminophen  650 mg Oral Q6H PRN Cinthya Rdz, DO      aluminum-magnesium hydroxide-simethicone  30 mL Oral Q6H PRN Cinthya Rdz, DO      amiodarone  100 mg Oral Daily With Breakfast Elian Sullivan MD      atorvastatin  40 mg Oral Daily With Dinner Cinthya Rdz, DO      enoxaparin  40 mg Subcutaneous Daily Cinthya Rdz, DO      fluticasone  1 spray Nasal Daily PRN Cinthya Rdz, DO      folic acid  3 mg Oral Daily Cinthya Rdz, DO      insulin lispro  1-5 Units Subcutaneous HS Cinthya Rdz, DO      insulin lispro  2-12 Units Subcutaneous TID AC Cinthya Rdz, DO      ketorolac  15 mg Intravenous Q6H PRN Cinthya Rdz, DO      ketorolac  30 mg Intravenous Q6H PRN Cinthya Rdz, DO      [START ON 6/27/2021] methylPREDNISolone  16 mg Oral Daily Cinthya Rdz DO      Followed by   Addie Vazquez ON 6/28/2021] methylPREDNISolone  12 mg Oral Daily Cinthya Rdz, DO      Followed by   Addie Vazquez ON 6/29/2021] methylPREDNISolone  8 mg Oral Daily Cinthya Rdz, DO Followed by   Arnel Olivas ON 6/30/2021] methylPREDNISolone  4 mg Oral Daily Sravan Duncan, DO      metoprolol succinate  200 mg Oral Daily Osman Vidal MD      ondansetron  4 mg Intravenous Q6H PRN Sravan Duncan, DO          Today, Patient Was Seen By: Osman Vidal MD    **Please Note: This note may have been constructed using a voice recognition system  **

## 2021-06-26 NOTE — UTILIZATION REVIEW
Initial Clinical Review    Admission: Date/Time/Statement:   Admission Orders (From admission, onward)     Ordered        06/25/21 1352  Place in Observation  Once                   Orders Placed This Encounter   Procedures    Place in Observation     Standing Status:   Standing     Number of Occurrences:   1     Order Specific Question:   Level of Care     Answer:   Med Surg [16]     ED Arrival Information     Expected Arrival Acuity    - 6/25/2021 10:06 Urgent         Means of arrival Escorted by Service Admission type    Ambulance [facility] EMS Hospitalist Urgent         Arrival complaint    left acute hip pain        Chief Complaint   Patient presents with    Hip Pain     left non traumatic hip pain       Initial Presentation: 62 yo male with PMhx of testicular cancer and lung cancer s/p R lobectomy in 1982, IDDM and A-fib presents to ED by EMS who presents to ED with L hip pain  Pt is a  who resides in [place]  States pain started yesterday after his flight while in his hotel  Denies trauma  In ED XR showed degenerative disease with initial thought of trochanteric bursitis and plan was to d/c patient home from the ED, but was unable to bear much weight with ambulation  Admit observation to M/S unit with pain control, PT/OT evals  -- Toradol prn  Medrol dose-pack started  Continue previous po meds  Accuchecks w/ ssi  Ortho consult 6/26 -- A: left hip pain 2/2 greater trochanteric bursitis  Plan:  WBAT LLE  Consider left hip steroid injection as well  If he does not continue to improve on current regimen will discuss with patient  PT/OT  Continue analgesics prn       ED Triage Vitals   Temperature Pulse Respirations Blood Pressure SpO2   06/25/21 1028 06/25/21 1029 06/25/21 1028 06/25/21 1029 06/25/21 1029   98 °F (36 7 °C) 59 16 141/76 97 %      Temp src Heart Rate Source Patient Position - Orthostatic VS BP Location FiO2 (%)   -- -- -- -- --             Pain Score       06/25/21 1029       Worst Possible Pain          Wt Readings from Last 1 Encounters:   06/25/21 109 kg (240 lb)     Additional Vital Signs:   Date/Time  Temp  Pulse  Resp  BP  MAP (mmHg)  SpO2  O2 Device   06/26/21 07:51:16  97 9 °F (36 6 °C)  --  --  121/69  86  --  --   06/25/21 1029  --  59  --  141/76  --  97 %  None (Room air)   06/25/21 1028  98 °F (36 7 °C)  --  16  --  --  --  --       Pertinent Labs/Diagnostic Test Results:   XR L hip 6/25 -- Degenerative changes left hip  No acute osseous abnormality         Results from last 7 days   Lab Units 06/25/21  1805   WBC Thousand/uL 8 08   HEMOGLOBIN g/dL 16 7   HEMATOCRIT % 49 7*   PLATELETS Thousands/uL 222     Results from last 7 days   Lab Units 06/25/21  1805   SODIUM mmol/L 136   POTASSIUM mmol/L 4 2   CHLORIDE mmol/L 106   CO2 mmol/L 24   ANION GAP mmol/L 6   BUN mg/dL 11   CREATININE mg/dL 0 68   EGFR ml/min/1 73sq m 103   CALCIUM mg/dL 9 1     Results from last 7 days   Lab Units 06/26/21  0618 06/25/21  2219 06/25/21  1726   POC GLUCOSE mg/dl 128 109 112     Results from last 7 days   Lab Units 06/25/21  1805   GLUCOSE RANDOM mg/dL 183*             ED Treatment:   Medication Administration from 06/25/2021 1006 to 06/25/2021 1629       Date/Time Order Dose Route Action     06/25/2021 1038 ketorolac (TORADOL) injection 15 mg 15 mg Intramuscular Given     06/25/2021 1110 lidocaine (LIDODERM) 5 % patch 1 patch 1 patch Topical Medication Applied     06/25/2021 1217 oxyCODONE-acetaminophen (PERCOCET) 5-325 mg per tablet 1 tablet 1 tablet Oral Given     Past Medical History:   Diagnosis Date    At[facility] fibrillation ([facility] )     COPD (chronic obstructive pulmonary disease) ([facility] )     Diabetes mellitus ([facility] )     Lung cancer ([facility] )     Psoriasis     Psoriatic arthritis ([facility] )     Testicular cancer ([facility] )      Admitting Diagnosis: Greater trochanteric bursitis of left hip [M70 62]  Hip pain, acute, left [M25 552]  Ambulatory dysfunction [R26 2]  Age/Sex: 63 y o  male  Admission Orders:  Scheduled Medications:  atorvastatin, 40 mg, Oral, Daily With Dinner  celecoxib, 100 mg, Oral, BID  enoxaparin, 40 mg, Subcutaneous, Daily  folic acid, 3 mg, Oral, Daily  insulin lispro, 1-5 Units, Subcutaneous, HS  insulin lispro, 2-12 Units, Subcutaneous, TID AC  [START ON 6/27/2021] methylPREDNISolone, 16 mg, Oral, Daily   Followed by  Linnea Gentile ON 6/28/2021] methylPREDNISolone, 12 mg, Oral, Daily   Followed by  Linnea Gentile ON 6/29/2021] methylPREDNISolone, 8 mg, Oral, Daily   Followed by  Linnea Bal ON 6/30/2021] methylPREDNISolone, 4 mg, Oral, Daily    PRN Meds:  acetaminophen, 650 mg, Oral, Q6H PRN  aluminum-magnesium hydroxide-simethicone, 30 mL, Oral, Q6H PRN  fluticasone, 1 spray, Nasal, Daily PRN  ketorolac, 15 mg, Intravenous, Q6H PRN 6/25 x1, 6/26 x1  ketorolac, 30 mg, Intravenous, Q6H PRN 6/25 x1, 6/26 x3  ondansetron, 4 mg, Intravenous, Q6H PRN      Network Utilization Review Department  ATTENTION: Please call with any questions or concerns to [phone] and carefully listen to the prompts so that you are directed to the right person  All voicemails are confidential   Hermelinda Garcia all requests for admission clinical reviews, approved or denied determinations and any other requests to dedicated fax number below belonging to the campus where the patient is receiving treatment   List of dedicated fax numbers for the Facilities:  [address] DENIALS (Administrative/Medical Necessity) [phone]   [address] (Maternity/NICU/Pediatrics) [phone]   [address] [phone]   [address] Osman Herrera [address] [address] [place] [address] [phone]     [address]  [place] [address] [address] [place] [address] [phone]

## 2021-06-26 NOTE — ASSESSMENT & PLAN NOTE
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

## 2021-06-26 NOTE — CONSULTS
Orthopedics   Ritika Davis 63 y o  male MRN: [id number]  Unit/Bed#: [facility] 761-01      Chief Complaint:   left hip pain    HPI:   63 y o male who is a community ambulator who presents with left hip pain  He has a history of lumbar spine surgery but no hip pain or injuries  He notes on Thursday he was at a hotel on vacation when he was doing exercises for his plantar fasciitis  After that he was walking when he felt immense pain in his hip that he has never before  Pain is in his lateral hip without radiation  Pain is worse with ambulator and better with rest  No associated numbness or tingling  He has never felt this pain before  Review Of Systems:   · Skin: Normal  · Neuro: See HPI  · Musculoskeletal: See HPI  · 14 point review of systems negative except as stated above     Past Medical History:   Past Medical History:   Diagnosis Date    Atrial fibrillation ([facility] )     COPD (chronic obstructive pulmonary disease) ([facility] )     Diabetes mellitus ([facility] )     Lung cancer ([facility] )     Psoriasis     Psoriatic arthritis ([facility] )     Testicular cancer ([facility] )        Past Surgical History:   Past Surgical History:   Procedure Laterality Date    BACK SURGERY      LUNG LOBECTOMY         Family History:  Family history reviewed and non-contributory  History reviewed  No pertinent family history      Social History:  Social History     Socioeconomic History    Marital status: /Civil Union     Spouse name: None    Number of children: None    Years of education: None    Highest education level: None   Occupational History    None   Tobacco Use    Smoking status: Never Smoker    Smokeless tobacco: Never Used   Substance and Sexual Activity    Alcohol use: Never    Drug use: None    Sexual activity: Never   Other Topics Concern    None   Social History Narrative    None     Social Determinants of Health     Financial Resource Strain:     Difficulty of Paying Living Expenses:    Food Insecurity:     Worried About Running Out of Food in the Last Year:     Arely of Food in the Last Year:    Transportation Needs:     Lack of Transportation (Medical):  Lack of Transportation (Non-Medical):    Physical Activity:     Days of Exercise per Week:     Minutes of Exercise per Session:    Stress:     Feeling of Stress :    Social Connections:     Frequency of Communication with Friends and Family:     Frequency of Social Gatherings with Friends and Family:     Attends Oriental orthodox Services:     Active Member of Clubs or Organizations:     Attends Club or Organization Meetings:     Marital Status:    Intimate Partner Violence:     Fear of Current or Ex-Partner:     Emotionally Abused:     Physically Abused:     Sexually Abused: Allergies:    Allergies   Allergen Reactions    Morphine Vomiting           Labs:  0   Lab Value Date/Time    HCT 49 7 (H) 06/25/2021 1805    HGB 16 7 06/25/2021 1805    WBC 8 08 06/25/2021 1805       Meds:    Current Facility-Administered Medications:     acetaminophen (TYLENOL) tablet 650 mg, 650 mg, Oral, Q6H PRN, Alexandre Elmore DO    aluminum-magnesium hydroxide-simethicone (MYLANTA) oral suspension 30 mL, 30 mL, Oral, Q6H PRN, Alexandre Elmore DO    amiodarone tablet 100 mg, 100 mg, Oral, Daily With Breakfast, Tyrone Thompson MD, 100 mg at 06/26/21 1441    atorvastatin (LIPITOR) tablet 40 mg, 40 mg, Oral, Daily With Dinner, Alexandre Elmore DO, 40 mg at 06/25/21 1923    enoxaparin (LOVENOX) subcutaneous injection 40 mg, 40 mg, Subcutaneous, Daily, Alexandre Elmore DO, 40 mg at 06/26/21 0831    fluticasone (FLONASE) 50 mcg/act nasal spray 1 spray, 1 spray, Nasal, Daily PRN, Alexandre Elmore DO    folic acid (FOLVITE) tablet 3 mg, 3 mg, Oral, Daily, Alexandre Elmore DO, 3 mg at 06/26/21 0831    insulin lispro (HumaLOG) 100 units/mL subcutaneous injection 1-5 Units, 1-5 Units, Subcutaneous, HS, Alexandre Elmore DO    insulin lispro (HumaLOG) 100 units/mL subcutaneous injection 2-12 Units, 2-12 Units, Subcutaneous, TID AC, 2 Units at 06/26/21 1211 **AND** Fingerstick Glucose (POCT), , , TID AC, Jenny Gehrig, DO    ketorolac (TORADOL) injection 15 mg, 15 mg, Intravenous, Q6H PRN, Jenny Gehrig, DO, 15 mg at 06/26/21 0327    ketorolac (TORADOL) injection 30 mg, 30 mg, Intravenous, Q6H PRN, Jenny Gehrig, DO, 30 mg at 06/26/21 1441    [COMPLETED] methylprednisolone (MEDROL) tablet 24 mg, 24 mg, Oral, Daily, 24 mg at 06/25/21 2000 **FOLLOWED BY** [COMPLETED] methylprednisolone (MEDROL) tablet 20 mg, 20 mg, Oral, Daily, 20 mg at 06/26/21 0830 **FOLLOWED BY** [START ON 6/27/2021] methylPREDNISolone (MEDROL) tablet 16 mg, 16 mg, Oral, Daily **FOLLOWED BY** [START ON 6/28/2021] methylprednisolone (MEDROL) tablet 12 mg, 12 mg, Oral, Daily **FOLLOWED BY** [START ON 6/29/2021] methylprednisolone (MEDROL) tablet 8 mg, 8 mg, Oral, Daily **FOLLOWED BY** [START ON 6/30/2021] methylprednisolone (MEDROL) tablet 4 mg, 4 mg, Oral, Daily, Jenny Gehrig, DO    metoprolol succinate (TOPROL-XL) 24 hr tablet 200 mg, 200 mg, Oral, Daily, Ana Cristina Branch MD    ondansetron [facility]) injection 4 mg, 4 mg, Intravenous, Q6H PRN, Jenny Gehrig, DO    Blood Culture:   No results found for: BLOODCX    Wound Culture:   No results found for: WOUNDCULT    Ins and Outs:  I/O last 24 hours: In: -   Out: 350 [Urine:350]          Physical Exam:   /75   Pulse 59   Temp 97 8 °F (36 6 °C)   Resp 16   Ht 6' (1 829 m)   Wt 109 kg (240 lb)   SpO2 97%   BMI 32 55 kg/m²   Gen: Alert and oriented to person, place, time  HEENT: EOMI, eyes clear, moist mucus membranes, hearing intact  Respiratory: Bilateral chest rise   No audible wheezing found  Cardiovascular: No audible murmurs  Abdomen: soft  Musculoskeletal: left lower extremity  · Skin pink, dry, and intact, no erythema  · TTP over greater trochanter, no TTP over groin, SI joint, or lumbar spine  · Painless range of motion of hip joint with no micromotion tenderness  · Sensation intact L3-S1  · 5/5 motor strength to hip flexion/extension, knee flexion/extension, ankle dorsi/plantar flexion  · Mild pain with CHRISSY test, no pain with logroll, Stinchfield, or FADIR testing  · 2+ DP pulse    Radiology:   I personally reviewed the films  X-rays of left hip shows degenerative changes with no acute fractures or dislocations    Assessment:  60 y o male with left hip pain secondary to greater trochanteric bursitis    Plan:   · WBAT LLE  · Continue PO regimen of pain medication  · Can consider left hip steroid injection as well  If he does not continue to improve on current regimen will discuss with patient  · PT  · Pain control  · Body mass index is 32 55 kg/m²  moderately obese  Recommend behavior modifications and nutrition    · Dispo: Ortho will follow      Kim Hermosillo MD

## 2021-06-27 ENCOUNTER — APPOINTMENT (OUTPATIENT)
Dept: NON INVASIVE DIAGNOSTICS | Facility: HOSPITAL | Age: 61
End: 2021-06-27
Payer: OTHER GOVERNMENT

## 2021-06-27 VITALS
TEMPERATURE: 97.5 F | SYSTOLIC BLOOD PRESSURE: 137 MMHG | RESPIRATION RATE: 16 BRPM | HEART RATE: 59 BPM | OXYGEN SATURATION: 97 % | BODY MASS INDEX: 32.51 KG/M2 | DIASTOLIC BLOOD PRESSURE: 76 MMHG | HEIGHT: 72 IN | WEIGHT: 240 LBS

## 2021-06-27 LAB
ALBUMIN SERPL BCP-MCNC: 3.2 G/DL (ref 3.5–5)
ALP SERPL-CCNC: 75 U/L (ref 46–116)
ALT SERPL W P-5'-P-CCNC: 30 U/L (ref 12–78)
ANION GAP SERPL CALCULATED.3IONS-SCNC: 6 MMOL/L (ref 4–13)
AST SERPL W P-5'-P-CCNC: 14 U/L (ref 5–45)
BASOPHILS # BLD AUTO: 0.01 THOUSANDS/ΜL (ref 0–0.1)
BASOPHILS NFR BLD AUTO: 0 % (ref 0–1)
BILIRUB SERPL-MCNC: 0.79 MG/DL (ref 0.2–1)
BUN SERPL-MCNC: 13 MG/DL (ref 5–25)
CALCIUM ALBUM COR SERPL-MCNC: 9.2 MG/DL (ref 8.3–10.1)
CALCIUM SERPL-MCNC: 8.6 MG/DL (ref 8.3–10.1)
CHLORIDE SERPL-SCNC: 107 MMOL/L (ref 100–108)
CO2 SERPL-SCNC: 28 MMOL/L (ref 21–32)
CREAT SERPL-MCNC: 0.61 MG/DL (ref 0.6–1.3)
EOSINOPHIL # BLD AUTO: 0.03 THOUSAND/ΜL (ref 0–0.61)
EOSINOPHIL NFR BLD AUTO: 0 % (ref 0–6)
ERYTHROCYTE [DISTWIDTH] IN BLOOD BY AUTOMATED COUNT: 12.4 % (ref 11.6–15.1)
GFR SERPL CREATININE-BSD FRML MDRD: 108 ML/MIN/1.73SQ M
GLUCOSE P FAST SERPL-MCNC: 112 MG/DL (ref 65–99)
GLUCOSE SERPL-MCNC: 100 MG/DL (ref 65–140)
GLUCOSE SERPL-MCNC: 112 MG/DL (ref 65–140)
GLUCOSE SERPL-MCNC: 118 MG/DL (ref 65–140)
GLUCOSE SERPL-MCNC: 168 MG/DL (ref 65–140)
HCT VFR BLD AUTO: 47.4 % (ref 36.5–49.3)
HGB BLD-MCNC: 16 G/DL (ref 12–17)
IMM GRANULOCYTES # BLD AUTO: 0.02 THOUSAND/UL (ref 0–0.2)
IMM GRANULOCYTES NFR BLD AUTO: 0 % (ref 0–2)
LYMPHOCYTES # BLD AUTO: 1.67 THOUSANDS/ΜL (ref 0.6–4.47)
LYMPHOCYTES NFR BLD AUTO: 17 % (ref 14–44)
MAGNESIUM SERPL-MCNC: 2 MG/DL (ref 1.6–2.6)
MCH RBC QN AUTO: 30.7 PG (ref 26.8–34.3)
MCHC RBC AUTO-ENTMCNC: 33.8 G/DL (ref 31.4–37.4)
MCV RBC AUTO: 91 FL (ref 82–98)
MONOCYTES # BLD AUTO: 0.96 THOUSAND/ΜL (ref 0.17–1.22)
MONOCYTES NFR BLD AUTO: 10 % (ref 4–12)
NEUTROPHILS # BLD AUTO: 7.32 THOUSANDS/ΜL (ref 1.85–7.62)
NEUTS SEG NFR BLD AUTO: 73 % (ref 43–75)
NRBC BLD AUTO-RTO: 0 /100 WBCS
PLATELET # BLD AUTO: 244 THOUSANDS/UL (ref 149–390)
PMV BLD AUTO: 10.8 FL (ref 8.9–12.7)
POTASSIUM SERPL-SCNC: 3.7 MMOL/L (ref 3.5–5.3)
PROT SERPL-MCNC: 6.7 G/DL (ref 6.4–8.2)
RBC # BLD AUTO: 5.22 MILLION/UL (ref 3.88–5.62)
SODIUM SERPL-SCNC: 141 MMOL/L (ref 136–145)
WBC # BLD AUTO: 10.01 THOUSAND/UL (ref 4.31–10.16)

## 2021-06-27 PROCEDURE — NC001 PR NO CHARGE: Performed by: ORTHOPAEDIC SURGERY

## 2021-06-27 PROCEDURE — 80053 COMPREHEN METABOLIC PANEL: CPT | Performed by: INTERNAL MEDICINE

## 2021-06-27 PROCEDURE — 97166 OT EVAL MOD COMPLEX 45 MIN: CPT

## 2021-06-27 PROCEDURE — 99217 PR OBSERVATION CARE DISCHARGE MANAGEMENT: CPT | Performed by: INTERNAL MEDICINE

## 2021-06-27 PROCEDURE — 82948 REAGENT STRIP/BLOOD GLUCOSE: CPT

## 2021-06-27 PROCEDURE — 99244 OFF/OP CNSLTJ NEW/EST MOD 40: CPT | Performed by: ORTHOPAEDIC SURGERY

## 2021-06-27 PROCEDURE — 85025 COMPLETE CBC W/AUTO DIFF WBC: CPT | Performed by: INTERNAL MEDICINE

## 2021-06-27 PROCEDURE — 97162 PT EVAL MOD COMPLEX 30 MIN: CPT

## 2021-06-27 PROCEDURE — 93970 EXTREMITY STUDY: CPT

## 2021-06-27 PROCEDURE — 83735 ASSAY OF MAGNESIUM: CPT | Performed by: INTERNAL MEDICINE

## 2021-06-27 RX ORDER — KETOROLAC TROMETHAMINE 30 MG/ML
30 INJECTION, SOLUTION INTRAMUSCULAR; INTRAVENOUS ONCE
Status: COMPLETED | OUTPATIENT
Start: 2021-06-27 | End: 2021-06-27

## 2021-06-27 RX ORDER — METHYLPREDNISOLONE 4 MG/1
4 TABLET ORAL DAILY
Qty: 1 TABLET | Refills: 0 | Status: SHIPPED | OUTPATIENT
Start: 2021-06-30 | End: 2021-07-01

## 2021-06-27 RX ORDER — AMLODIPINE BESYLATE 5 MG/1
5 TABLET ORAL DAILY
Qty: 1 TABLET | Refills: 0 | Status: SHIPPED | OUTPATIENT
Start: 2021-06-27

## 2021-06-27 RX ORDER — METHYLPREDNISOLONE 8 MG/1
8 TABLET ORAL DAILY
Qty: 1 TABLET | Refills: 0 | Status: SHIPPED | OUTPATIENT
Start: 2021-06-29 | End: 2021-06-30

## 2021-06-27 RX ORDER — AMLODIPINE BESYLATE 5 MG/1
5 TABLET ORAL DAILY
Status: DISCONTINUED | OUTPATIENT
Start: 2021-06-27 | End: 2021-06-27 | Stop reason: HOSPADM

## 2021-06-27 RX ORDER — METOPROLOL SUCCINATE 200 MG/1
200 TABLET, EXTENDED RELEASE ORAL DAILY
Qty: 1 TABLET | Refills: 0 | Status: SHIPPED | OUTPATIENT
Start: 2021-06-27

## 2021-06-27 RX ORDER — METHYLPREDNISOLONE 4 MG/1
12 TABLET ORAL DAILY
Qty: 3 TABLET | Refills: 0 | Status: SHIPPED | OUTPATIENT
Start: 2021-06-28 | End: 2021-06-29

## 2021-06-27 RX ORDER — TELMISARTAN 40 MG/1
40 TABLET ORAL DAILY
Qty: 1 TABLET | Refills: 0 | Status: SHIPPED | OUTPATIENT
Start: 2021-06-27

## 2021-06-27 RX ORDER — OXYCODONE HYDROCHLORIDE AND ACETAMINOPHEN 5; 325 MG/1; MG/1
1 TABLET ORAL EVERY 4 HOURS PRN
Qty: 20 TABLET | Refills: 0 | Status: SHIPPED | OUTPATIENT
Start: 2021-06-27 | End: 2021-07-07

## 2021-06-27 RX ORDER — AMIODARONE HYDROCHLORIDE 100 MG/1
100 TABLET ORAL DAILY
Status: ON HOLD | COMMUNITY
End: 2021-06-27 | Stop reason: SDUPTHER

## 2021-06-27 RX ORDER — AMIODARONE HYDROCHLORIDE 100 MG/1
100 TABLET ORAL DAILY
Qty: 1 TABLET | Refills: 0 | Status: SHIPPED | OUTPATIENT
Start: 2021-06-27

## 2021-06-27 RX ADMIN — ACETAMINOPHEN 650 MG: 325 TABLET, FILM COATED ORAL at 03:25

## 2021-06-27 RX ADMIN — KETOROLAC TROMETHAMINE 15 MG: 30 INJECTION, SOLUTION INTRAMUSCULAR; INTRAVENOUS at 10:49

## 2021-06-27 RX ADMIN — ATORVASTATIN CALCIUM 40 MG: 40 TABLET, FILM COATED ORAL at 16:25

## 2021-06-27 RX ADMIN — FOLIC ACID 3 MG: 1 TABLET ORAL at 08:11

## 2021-06-27 RX ADMIN — KETOROLAC TROMETHAMINE 30 MG: 30 INJECTION, SOLUTION INTRAMUSCULAR; INTRAVENOUS at 04:36

## 2021-06-27 RX ADMIN — ACETAMINOPHEN 650 MG: 325 TABLET, FILM COATED ORAL at 09:24

## 2021-06-27 RX ADMIN — KETOROLAC TROMETHAMINE 30 MG: 30 INJECTION, SOLUTION INTRAMUSCULAR at 13:25

## 2021-06-27 RX ADMIN — METHYLPREDNISOLONE 16 MG: 16 TABLET ORAL at 08:13

## 2021-06-27 RX ADMIN — AMLODIPINE BESYLATE 5 MG: 5 TABLET ORAL at 13:26

## 2021-06-27 RX ADMIN — METOPROLOL SUCCINATE 200 MG: 100 TABLET, EXTENDED RELEASE ORAL at 08:11

## 2021-06-27 RX ADMIN — ENOXAPARIN SODIUM 40 MG: 40 INJECTION SUBCUTANEOUS at 08:10

## 2021-06-27 RX ADMIN — AMIODARONE HYDROCHLORIDE 100 MG: 200 TABLET ORAL at 08:11

## 2021-06-27 NOTE — OCCUPATIONAL THERAPY NOTE
Occupational Therapy Evaluation     Patient Name: Nikky HERNANDEZ Date: 6/27/2021  Problem List  Principal Problem:    Trochanteric bursitis of left hip  Active Problems:    Type 2 diabetes mellitus, without long-term current use of insulin (HCC)    Essential hypertension    Morbid obesity (HCC)    Atrial fibrillation (HCC)    COPD (chronic obstructive pulmonary disease) (HCC)    Psoriatic arthritis (HCC)    HLD (hyperlipidemia)    Past Medical History  Past Medical History:   Diagnosis Date    Atrial fibrillation ([facility] )     [facility]) [facility]     Diabetes mellitus ([facility] )     Lung cancer ([place] [address] )     Psoriasis     Psoriatic arthritis ([facility] )     Testicular cancer [facility])      Past Surgical History  Past Surgical History:   Procedure Laterality Date    BACK SURGERY      LUNG LOBECTOMY           06/27/21 0912   OT Last Visit   OT Visit Date 06/27/21   Note Type   Note type Evaluation   Restrictions/Precautions   Weight Bearing Precautions Per Order Yes   LLE Weight Bearing Per Order WBAT   Other Precautions WBS;Pain; Fall Risk   Pain Assessment   Pain Assessment Tool 0-10   Pain Score 5   Pain Location/Orientation Orientation: Left; Location: Hip   Hospital Pain Intervention(s) Repositioned; Ambulation/increased activity; Emotional support; Rest   Home Living   Type of [address] Performs ADLs on one level; Able to live on main level with bedroom/bathroom;Multi-level   Bathroom Shower/Tub Tub/shower unit   Bathroom Toilet Standard   Bathroom Accessibility Accessible   Home Equipment Reacher   Additional Comments Pt is an [place] resident   Prior Function   Level of [address] with ADLs and functional mobility   Lives With [place] Help From Family;Friend(s)   ADL Assistance Independent   IADLs Independent   Falls in the last 6 months 1 to 4  (Pt reports 2 in ED for current admission )   Vocational Full time employment   Lifestyle   Autonomy Pt reports full independence w/ ADLs, IADLs (+driving), and functional mobility w/o DME/AD PTA  Pt spends multiple days away from home environment and support due to occupation  Reciprocal Relationships Pt reports living with his wife who is in reasonable health and able to provide assist if needed  Service to Others Pt retired  and full-time  for [employer]  Intrinsic Gratification Pt outgoing and social    Psychosocial   Psychosocial (WDL) WDL   Subjective   Subjective "Can I discharge and catch the afternoon flight?"   ADL   Where Assessed Edge of bed   Eating Assistance 7  [address]  [address]  7  Independent   Bed Mobility   Supine to Sit 6  Modified independent   Additional items HOB elevated; Increased time required   Additional Comments Pt supine upron arrival  Post evaluation Pt OOB in chair with call bell and needs met  Transfers   Sit to Stand 5  Supervision   Additional items Increased time required   Stand to Sit 5  Supervision   Additional items Increased time required   Additional Comments no AD   Functional Mobility   Functional Mobility 5  Supervision   Additional Comments Pt demonstrated long distance home mobility in hallway w/o AD      Balance   Static Sitting Good   Dynamic Sitting Fair +   Static Standing Fair   Dynamic Standing Fair   Ambulatory Fair   Activity Tolerance   Activity Tolerance Patient tolerated treatment well;Patient limited by pain   Medical Staff Made Aware PT Paul Hennessy [place] [address]    Nurse Made Aware appropriate to see per RN   RUE Assessment   RUE Assessment WFL   LUE Assessment   LUE Assessment WFL   Hand Function   Gross Motor Coordination Functional   Fine Motor Coordination Functional   Cognition   Overall Cognitive Status [facility] Arousal/Participation Alert; Responsive; Cooperative   Attention Within functional limits   Orientation Level Oriented X4   Memory Within functional limits   Following Commands Follows all commands and directions without difficulty   Comments Pt pleasant and open to working with therapy  Pt engaged in appropriate conversations throughout evaluation and clarified concerns/questions about d/c home  Assessment   Limitation Decreased ADL status; Decreased high-level ADLs   Prognosis Good   Assessment Pt is a 63 y o  male seen for OT evaluation after admission to [facility]/25/2021 with Trochanteric bursitis of left hip  Pt has WBAT LLE orders per ortho  Pt  has a past medical history of Atrial fibrillation, COPD (chronic obstructive pulmonary disease) , Diabetes mellitus , Lung cancer , Psoriasis, Psoriatic arthritis, and Testicular cancer  Contexts influencing pts occupational performance include having to travel back to home in [place], to a single story home with wife who is retired and in reasonable health to provide assist if needed  PTA, pt was independent assist in ADLs, IADLs, & functional mobility w/o DME/AD  Pt drives at baseline  At time of evaluation pt independent self-feeding, independent grooming, independent UB ADLs, MIN A  LB ADLs, independent toileting, MOD I for bed mobiltiy,and S for functional mobility and functional transfers w/o AD  Pt performance appears consistent with baseline functioning and anticipated recovery with increased family support  LHAE recommended for LB dressing 2* pain in LLE  Discharge from acute OT services is recommended at this time with continued engagement with nursing, CM,  PT, and restorative technicians during hospital stay  The patient's raw score on the AM-PAC Daily Activity inpatient short form is 22, standardized score is 47 1, greater than 39 4  Patients at this level are likely to benefit from discharge to home   Please refer to the recommendation of the Occupational Therapist for safe discharge planning  Goals   Patient Goals to catch afternoon flight home    Plan   OT Frequency Eval only   Recommendation   OT Discharge Recommendation No rehabilitation needs   Equipment Recommended Hip Kit ($)   OT - OK to Discharge Yes  (when medically stable )   AM-PAC Daily Activity Inpatient   Lower Body Dressing 3   Bathing 3   Toileting 4   Upper Body Dressing 4   Grooming 4   Eating 4   Daily Activity Raw Score 22   Daily Activity Standardized Score (Calc for Raw Score >=11) 47  1   AM-PAC Applied Cognition Inpatient   Following a Speech/Presentation 4   Understanding Ordinary Conversation 4   Taking Medications 4   Remembering Where Things Are Placed or Put Away 4   Remembering List of 4-5 Errands 4   Taking Care of Complicated Tasks 4   Applied Cognition Raw Score 24   Applied Cognition Standardized Score 62 21   Modified [place] Scale   Modified [place] Scale 3     LEIGH Lopez

## 2021-06-27 NOTE — PROGRESS NOTES
Procedure- Orthopedics   Ally Leiva 63 y o  male MRN: [id number]  Unit/Bed#: [facility] 761-01    Procedure: left hip bursa injection    After sterile preparation of the skin overlying left greater trochanter, a 22g 3inch spinal needle was introduced down to bone  Needle was then pulled back 2mm and a mixture of 2cc 1% lidocaine, 2cc   5% Marcaine, 2cc Betamethasone was introduced into the greater trochanter bursa  Sterile dressing was then applied  Pt tolerated procedure well      Zurdo Kamara MD

## 2021-06-27 NOTE — CASE MANAGEMENT
CM informed by bedside RN, pt's is written for d/c today, home no needs  Bedside RN states, pt's home medications were brought in at admission, and are unable to be located at this time  CM spoke with pt, who confirmed same , Pt reports medication was likely misplaced in the ER prior to admission to medical floor  Pt reports he resides in [place] and has Pitney [place] Antonio  Pt travel's frequently as an Airplane pliot an typically uses local [employer] for 4-5 day travel prescriptions  Pt informed medications misplaced were a 5-10 day supply for travel only  Pt reports he has a 90 day supply currently at home  Pt reports he is able to receive an emergency replacement prescription for a 4-5 day supply through his prescription coverage  Pt informed CM, replacement cost's is at minimal low cost  Pt declined the need for  to cover cost for replacement at this time and is agreeable to covering cost for same  CM communicated same with Dr Briggs-RAJWINDER  Prescriptions sent to [address] for Emergency replacement medication for 4-5 day supply  Pt will pick/up prior to flight home tomorrow  No additional needs identified for d/c at this time

## 2021-06-27 NOTE — PROGRESS NOTES
Subjective: No acute events overnight  No acute distress  Pain continues to improve    Objective:  A 10 point ROS was performed; negative except as noted above       Lab Results   Component Value Date/Time    WBC 8 08 06/25/2021 06:05 PM    HGB 16 7 06/25/2021 06:05 PM       Vitals:    06/26/21 2241   BP: 126/74   Pulse:    Resp:    Temp: 97 7 °F (36 5 °C)   SpO2:      Left lower extremity  TTP L Greater trochanteric bursa  Positive CHRISSY  Motor intact to EHL/FHL/TA/GS  Sensation intact to light touch to dp/sp/tib/darin/saph distributions  Toes warm and well perfused with brisk capillary refill    Assessment: 63 y o  male with left greater trochanteric bursitis    Plan:  WBAT LLE  Continue PO Pain regimen per primary team  Will discuss bursa injection with team  Pain control  PT/OT  Dispo: Ortho will follow

## 2021-06-27 NOTE — PHYSICAL THERAPY NOTE
Physical Therapy Evaluation     Patient's Name: Segun Long    Admitting Diagnosis  Greater trochanteric bursitis of left hip [M70 62]  Hip pain, acute, left [M25 552]  Ambulatory dysfunction [R26 2]    Problem List  Patient Active Problem List   Diagnosis    Trochanteric bursitis of left hip    Type 2 diabetes mellitus, without long-term current use of insulin (HCC)    Essential hypertension    [facility] )    Atrial fibrillation (HCC)    COPD (chronic obstructive pulmonary disease) (HCC)    [facility] )    HLD (hyperlipidemia)       Past Medical History  Past Medical History:   Diagnosis Date    At[facility] fibrillation ([facility] )     COPD (chronic obstructive pulmonary disease) ([facility] )     Diabetes mellitus ([facility] )     Lung cancer ([place] [address] )     Psoriasis     Psoriatic arthritis ([place] [address] )     Testicular cancer [facility])        Past Surgical History  Past Surgical History:   Procedure Laterality Date    BACK SURGERY      LUNG LOBECTOMY          06/27/21 0911   PT Last Visit   PT Visit Date 06/27/21   Note Type   Note type Evaluation   Pain Assessment   Pain Assessment Tool 0-10   Pain Score 5   Pain Location/Orientation Orientation: Left; Location: Hip   Hospital Pain Intervention(s) Repositioned; Ambulation/increased activity; Emotional support   Home Living   Type of [address] [place] [address] One level   Home Equipment Walker   Additional Comments Pt resides in [place] originally  Pt was flying plane and then experience ambulatory dysfunction in [facility] airport   Pt reports ambulating w/out AD PTA   Prior Function   Level of [place] Independent with ADLs and functional mobility   Lives With Spouse   Receives Help From Family;Friend(s)   ADL Assistance Independent   IADLs Independent   Falls in the last 6 months 1 to 4   Comments Pt reports spouse can assist as needed   Restrictions/Precautions   Weight Bearing Precautions Per Order Yes   LLE Weight Bearing Per Order WBAT   Other Precautions WBS;Pain   General   Family/Caregiver Present No   Cognition   Overall Cognitive Status WFL   Arousal/Participation Alert   Orientation Level Oriented X4   Memory Within functional limits   Following Commands Follows all commands and directions without difficulty   Comments Pt pleasant and cooperative to work w/ therapy   RLE Assessment   RLE Assessment WFL   LLE Assessment   LLE Assessment WFL   Coordination   Movements are Fluid and Coordinated 1   Light Touch   RLE Light Touch Grossly intact   LLE Light Touch Grossly intact   Bed Mobility   Supine to Sit 6  Modified independent   Additional items HOB elevated; Increased time required   Sit to Supine Unable to assess   Additional Comments Pt lying supine upon PT arrival  Pt returned seated OOB at end of session w/ all needs within reach   Transfers   Sit to Stand 5  Supervision   Additional items Increased time required   Stand to Sit 5  Supervision   Additional items Increased time required   Additional Comments Transfers w/out AD   Ambulation/Elevation   Gait pattern Excessively slow; Short stride; Inconsistent kim;Decreased foot clearance;Shuffling   Gait Assistance 5  Supervision   Additional items Verbal cues   Assistive Device None   Distance 40ft x2   Balance   Static Sitting Good   Dynamic Sitting Fair +   Static Standing Fair   Dynamic Standing Fair   Ambulatory Fair   Endurance Deficit   Endurance Deficit Yes   Endurance Deficit Description pain   Activity Tolerance   Activity Tolerance Patient limited by pain   Medical Staff Made Aware OT Emilie   Nurse Made Aware yes   Assessment   Assessment Pt is 63 y o  male seen for PT evaluation s/p admit to [facility] on 6/25/2021 w/ Trochanteric bursitis of left hip  Per ortho, pt is to be WBAT on LLE  PT consulted to assess pt's functional mobility and d/c needs  Order placed for PT eval and tx, w/ up w/ A order   Comorbidities affecting pt's physical performance at time of assessment include: Type 2 DM, HTN, obesity, atrial fibrillation, COPD, psoriatic arthritis, HLD  PTA, pt was independent w/ all functional mobility w/ no AD and lives w/ spouse in one level house w/ 0 ORTIZ  Personal factors affecting pt at time of IE include: inability to ambulate household distances, inability to navigate community distances, positive fall history and inability to perform IADLs  Please find objective findings from PT assessment regarding body systems outlined above with impairments and limitations including weakness, decreased ROM, decreased endurance, gait deviations, pain, decreased activity tolerance, decreased functional mobility tolerance and fall risk  Pt performed bed mobility at Mod I  Pt performed STS at supervision  Pt ambulated 40ft x2 w/ no AD at supervision  The following objective measures performed on IE also reveal limitations: AM-PAC 6-Clicks: 03/36  Pt's clinical presentation is currently evolving seen in pt's presentation of recent admission for bursitis of L hip requiring medial attention, recent decline in function as compared to baseline, fall risk, pain  Pt appears to be functioning at baseline level with functional mobility; therefore, requires no immediate acute skilled PT services at this time  Pt with no further questions or concerns regarding PT services  Will D/C PT at this time  Please re-consult if pt's medical status changes  From PT/mobility standpoint, recommendation at time of d/c would be follow up w/ OP PT when back home in [place]     Goals   Patient Goals to return home today   Plan   PT Frequency   (D/C PT)   Recommendation   PT Discharge Recommendation   (follow up w/ OP PT when back in [place])   PT - OK to Discharge Yes   AM-PAC Basic Mobility Inpatient   Turning in Bed Without Bedrails 4   Lying on Back to Sitting on Edge of Flat Bed 3   Moving Bed to Chair 4   Standing Up From Chair 4   Walk in Room 3   Climb 3-5 Stairs 3   Basic Mobility Inpatient Raw Score 21   Basic Mobility Standardized Score 45 55   Modified [place] Scale   Modified [place] Scale 3     The patient's AM-PAC Basic Mobility Inpatient Short Form Raw Score is 21, Standardized Score is 45 55  A standardized score greater than 42 9 suggests the patient may benefit from discharge to home  Please also refer to the recommendation of the Physical Therapist for safe discharge planning      Israel Arredondo PT, DPT

## 2021-06-28 PROCEDURE — 93970 EXTREMITY STUDY: CPT | Performed by: SURGERY

## 2021-06-28 NOTE — ASSESSMENT & PLAN NOTE
· Home meds: Enbrel weekly (Sun), Methotrexate 8 tabs of 2 5 mg weekly (Sun) - continue on discharge

## 2021-06-28 NOTE — ASSESSMENT & PLAN NOTE
· Home medications include : Toprol  mg daily, Amlodipine 5 mg daily, Telmisartan 40 mg daily - continue on discharge

## 2021-06-28 NOTE — DISCHARGE SUMMARY
Discharging Physician / Practitioner: Raman Zamudio MD  PCP: No primary care provider on file  Admission Date:   Admission Orders (From admission, onward)     Ordered        06/25/21 1352  Place in Observation  Once                   Discharge Date: 06/27/21    Principal discharge diagnosis:  Left greater trochanteric bursitis    Secondary diagnoses:  1  Type 2 diabetes  2  Atrial fibrillation  3  Hypertension  4  COPD  5  Psoriatic arthritis  6  Hyperlipidemia    Consultations During Hospital Stay:  Orthopedics    Procedures Performed:   1  X-ray left/pelvis - Degenerative changes left hip  No acute osseous abnormality  2  Lower extremity venous Doppler - no DVT  3  Left greater trochanteric bursa steroid injection    Hospital Course:   Segun Long is a 63 y o  male patient with a history of paroxysmal atrial fibrillation, hypertension, psoriatic arthritis, hyperlipidemia, COPD, type 2 diabetes who originally presented to the hospital on 6/25/2021 due to left hip pain which was gradually worsening after a flight  He was noted to have left greater trochanteric bursitis  During attempt to discharge from the ED it was noted that he had severe ambulatory dysfunction and could not bear weight on his left lower extremity as he fell following an attempt  He improved clinically with a Medrol Dosepak and intravenous anti-inflammatory medications  He received left hip greater trochanteric steroid bursa injection on 06/27/2021  His ambulatory dysfunction improved and he was able to bear weight on his left lower extremity at the time of discharge  Condition at Discharge: stable    Discharge Day Visit / Exam:   * Please refer to separate progress note for these details *    Discussion with Family: Patient declined call to   Discharge instructions/Information to patient and family:   See after visit summary for information provided to patient and family        Provisions for Follow-Up Care:  See after visit summary for information related to follow-up care and any pertinent home health orders  Disposition:   Other: [email or web address] with plan to fly back to home in [place] tomorrow    Planned Readmission: No     Discharge Statement:  I spent 30 minutes discharging the patient  This time was spent on the day of discharge  I had direct contact with the patient on the day of discharge  Greater than 50% of the total time was spent examining patient, answering all patient questions, arranging and discussing plan of care with patient as well as directly providing post-discharge instructions  Additional time then spent on discharge activities  Discharge Medications:  See after visit summary for reconciled discharge medications provided to patient and/or family        **Please Note: This note may have been constructed using a voice recognition system**

## 2021-06-28 NOTE — PROGRESS NOTES
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  Pain worse with ambulation  · Presented to the ED on 6/25/21 - admitted as fell when he tried to bear weight on his left lower extremity  · Xray - "Degenerative changes left hip  No acute osseous abnormality "  · Pain improved with Medrol dose pack and Toradol prn  · Seen by ortho - underwent steroid injection, WBAT  Venous Doppler - negative for DVT  Able to ambulate on his own now    Type 2 diabetes mellitus, without long-term current use of insulin [facility])  Assessment & Plan  No results found for: HGBA1C    Recent Labs     06/26/21  2143 06/27/21  0658 06/27/21  1057 06/27/21  1618   POCGLU 146* 118 100 168*       Blood Sugar Average: Last 72 hrs:  (P) 882 [id number]     Restart metformin on discharge      Atrial fibrillation (HCC)  Assessment & Plan  Paroxymal  S/p CV  Ct home medications Toprol  mg daily and Amiodarone 100 mg daily  Not on anticoagulation at home    Essential hypertension  Assessment & Plan  · Home medications include : Toprol  mg daily, Amlodipine 5 mg daily, Telmisartan 40 mg daily - continue on discharge      COPD (chronic obstructive pulmonary disease) ([facility] )  Assessment & Plan  No exacerbation  Not on inhalers at home    Psoriatic arthritis [facility])  Assessment & Plan  · Home meds: Enbrel weekly (Sun), Methotrexate 8 tabs of 2 5 mg weekly (Sun) - continue on discharge    HLD (hyperlipidemia)  Assessment & Plan  · Ct statin    Patient Centered Rounds: I performed bedside rounds with nursing staff today      Education and Discussions with Family / Patient: Patient declined call to   Time Spent for Care: 20 minutes  More than 50% of total time spent on counseling and coordination of care as described above  Current Length of Stay: 0 day(s)  Current Patient Status: Observation   Discharge Plan:  Discharge today    Subjective:   Left hip pain much better    Objective:     Vitals:   Temp (24hrs), Av 6 °F (36 4 °C), Min:97 5 °F (36 4 °C), Max:97 7 °F (36 5 °C)    Temp:  [97 5 °F (36 4 °C)-97 7 °F (36 5 °C)] 97 5 °F (36 4 °C)  BP: (126-140)/(74-76) 137/76  Body mass index is 32 55 kg/m²  Physical Exam:   Physical Exam  Vitals reviewed  HENT:      Head: Normocephalic  Nose: Nose normal    Eyes:      Extraocular Movements: Extraocular movements intact  Cardiovascular:      Rate and Rhythm: Normal rate and regular rhythm  Pulmonary:      Effort: Pulmonary effort is normal  No respiratory distress  Breath sounds: Normal breath sounds  No wheezing  Abdominal:      General: Bowel sounds are normal  There is no distension  Palpations: Abdomen is soft  Musculoskeletal:         General: No swelling  Cervical back: Neck supple  Skin:     General: Skin is warm and dry  Neurological:      General: No focal deficit present  Mental Status: He is alert and oriented to person, place, and time     Psychiatric:         Mood and Affect: Mood normal          Behavior: Behavior normal           Additional Data:     Labs:  Results from last 7 days   Lab Units 21  0454   WBC Thousand/uL 10 01   HEMOGLOBIN g/dL 16 0   HEMATOCRIT % 47 4   PLATELETS Thousands/uL 244   NEUTROS PCT % 73   LYMPHS PCT % 17   MONOS PCT % 10   EOS PCT % 0     Results from last 7 days   Lab Units 21  0454   SODIUM mmol/L 141   POTASSIUM mmol/L 3 7   CHLORIDE mmol/L 107   CO2 mmol/L 28   BUN mg/dL 13   CREATININE mg/dL 0 61   ANION GAP mmol/L 6   CALCIUM mg/dL 8 6   ALBUMIN g/dL 3 2*   TOTAL BILIRUBIN mg/dL 0 79   ALK PHOS U/L 75   ALT U/L 30   AST U/L 14   GLUCOSE RANDOM mg/dL 112         Results from last 7 days   Lab Units 06/27/21  1618 06/27/21  1057 06/27/21  0658 06/26/21  2143 06/26/21  1558 06/26/21  1052 06/26/21  0618 06/25/21  2219 06/25/21  1726   POC GLUCOSE mg/dl 168* 100 118 146* 147* 168* 128 109 112               Lines/Drains:  Invasive Devices     None               Today, Patient Was Seen By: Kaya Crews MD    **Please Note: This note may have been constructed using a voice recognition system  **

## 2021-06-28 NOTE — ASSESSMENT & PLAN NOTE
No results found for: HGBA1C    Recent Labs     06/26/21  2143 06/27/21  0658 06/27/21  1057 06/27/21  1618   POCGLU 146* 118 100 168*       Blood Sugar Average: Last 72 hrs:  (P) 817 [id number]     Restart metformin on discharge

## 2021-06-28 NOTE — ASSESSMENT & PLAN NOTE
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